# Patient Record
Sex: MALE | Race: BLACK OR AFRICAN AMERICAN
[De-identification: names, ages, dates, MRNs, and addresses within clinical notes are randomized per-mention and may not be internally consistent; named-entity substitution may affect disease eponyms.]

---

## 2015-05-01 VITALS
SYSTOLIC BLOOD PRESSURE: 115 MMHG | DIASTOLIC BLOOD PRESSURE: 72 MMHG | DIASTOLIC BLOOD PRESSURE: 72 MMHG | SYSTOLIC BLOOD PRESSURE: 115 MMHG | DIASTOLIC BLOOD PRESSURE: 72 MMHG | SYSTOLIC BLOOD PRESSURE: 115 MMHG | SYSTOLIC BLOOD PRESSURE: 115 MMHG | DIASTOLIC BLOOD PRESSURE: 72 MMHG

## 2017-08-07 ENCOUNTER — HOSPITAL ENCOUNTER (OUTPATIENT)
Dept: HOSPITAL 61 - KCIC | Age: 77
Discharge: HOME | End: 2017-08-07
Attending: FAMILY MEDICINE
Payer: MEDICARE

## 2017-08-07 DIAGNOSIS — J45.40: Primary | ICD-10-CM

## 2017-08-07 PROCEDURE — 71020: CPT

## 2017-08-07 NOTE — [facility]
Single view chest

 

 

Indication: Asthma

 

 

 

Findings:

 

Heart size is normal. Pulmonary vasculature is within normal limits. No 

consolidation or pleural effusion. No pneumothorax.

 

IMPRESSION:

 

No evidence for heart failure or pneumonia.

 

Electronically signed by: Esteban Lamar MD (8/7/2017 2:29 PM) [place] [id number]

## 2018-12-03 ENCOUNTER — HOSPITAL ENCOUNTER (OUTPATIENT)
Dept: HOSPITAL 61 - NM | Age: 78
Discharge: HOME | End: 2018-12-03
Attending: INTERNAL MEDICINE
Payer: MEDICARE

## 2018-12-03 DIAGNOSIS — R07.89: Primary | ICD-10-CM

## 2018-12-03 DIAGNOSIS — I10: ICD-10-CM

## 2018-12-03 DIAGNOSIS — E11.9: ICD-10-CM

## 2018-12-03 PROCEDURE — 78452 HT MUSCLE IMAGE SPECT MULT: CPT

## 2018-12-03 PROCEDURE — 96374 THER/PROPH/DIAG INJ IV PUSH: CPT

## 2018-12-03 PROCEDURE — 96376 TX/PRO/DX INJ SAME DRUG ADON: CPT

## 2018-12-03 PROCEDURE — 93017 CV STRESS TEST TRACING ONLY: CPT

## 2018-12-03 PROCEDURE — A9500 TC99M SESTAMIBI: HCPCS

## 2018-12-03 PROCEDURE — 96375 TX/PRO/DX INJ NEW DRUG ADDON: CPT

## 2018-12-03 NOTE — RAD
MR#: [id number]

Account#: [id number]

Accession#: [id number]

Date of Study: 12/03/2018

Ordering Physician: SHANTAL SMITH, 

Referring Physician: JUANITA JIMENEZ Tech: RT GET Camacho) (N)





--------------- APPROVED REPORT --------------





Test Type:          Pharmacological

Stress Nurse/Tech: Yelena Rivera R.N.

Test Indications: chest pain

Cardiac History: htn, dm

Medications:     see ehr

Medical History: see ehr

Resting ECG:     sr, some ST depression noted in V3 adn V4

Resting Heart Rate: 77 bpm

Resting Blood Pressure: 140/73mmHg

Pretest Chest Pain: No chest pain



Nurse/Tech Notes

lungs cta, heart tones regular, good radial pulse

Consent: The procedure was explained to the patient in lay terms. Informed consent was witnessed. Ashutosh
eout was entered into [facility] [employer]. History and Stress Test performed by RT Artem (EMANUEL) (N)



Pharm. Details

Pharmacologic stress testing was performed using 0.4mg per 5ml of regadenoson given intravenously ove
r 7-10 seconds.



Stress Symptoms

No chest pain or symptoms.



POST EXERCISE

Reason for Termination: Infusion complete

Target HR: No

Max HR: 97 bpm

Max Blood Pressure: 146/90mmHg

Chest Pain: No. 

Arrhythmia: Yes. occasional unifocal PVC noted

ST Change: No. 



INTERPRETATION

Stress EKG Conclusion: Baseline EKG showed sinus rhythm.  No ischemic changes at peak stress.  No arr
hythmias.



Rest:            Stress:         Viability:   

Radiopharm.Tc99m OejufvgeyZc48o Sestamibi

Dose11.5mCi            34.2mCi            

Duration    13min.           13min.           

Img Date  12/03/2018 12/03/2018      



Rest Admin Site:IV - Right AntecubitalAdministrator:RT GET Mcgill)(N)

Stress Admin Site: IV - Right AntecubitalAdministrator: RT GET Mcgill)(N)



STRESS DATA

End Diast. Vol.102.0mlLVEDV index BSA48.0ml

End Syst. Vol.29.0mlLVESV index BSA14.0ml

Myocardial Cucv008.0gEject. Sqtusgqh97.0%



Stress Scores

Regional WT1.00Summed WT5.00

Regional WM0.00Summed WM0.00



Study quality was good.  Left Ventricular size was Normal at Rest and Stress.

Lung uptake was .  Left Ventricular ejection fraction is 72%.

The rest and stress images show normal perfusion, normal contraction and thickening.



LV Perf. Quant

17 Seg. SSS0.00

17 Seg. SRS2.00

17 Seg. SDS0.00

Stress Defect Extent (% LAD)0.00Rest Defect Extent (% LAD)0.00Rev. Defect Extent (% LAD)0.00

Stress Defect Extent (% LCX) 5.00Rest Defect Extent (% LCX)18.80Rev. Defect Extent (% LCX)0.00

Stress Defect Extent (% RCA)0.00Rest Defect Extent (% RCA)0.00Rev. Defect Extent (% RCA)0.00

Stress Defect Extent (% MARIA M)0.90Rest Defect Extent (% MARIA M)3.30Rev. Defect Extent (% MARIA M)0.00



Conclusion

1. Regadenoson cardioisotope stress test did not show any evidence of ischemia or infarct.

2. Normal left ventricular systolic function with ejection fraction calculated at 72%.

3. Low risk for cardiac events.



Signed by : Yohannes Galvez, 

Electronically Approved : 12/03/2018 13:04:00

## 2020-01-21 ENCOUNTER — HOSPITAL ENCOUNTER (OUTPATIENT)
Dept: HOSPITAL 61 - KCIC | Age: 80
Discharge: HOME | End: 2020-01-21
Attending: FAMILY MEDICINE
Payer: MEDICARE

## 2020-01-21 DIAGNOSIS — M47.812: Primary | ICD-10-CM

## 2020-01-21 DIAGNOSIS — M25.78: ICD-10-CM

## 2020-01-21 DIAGNOSIS — M48.02: ICD-10-CM

## 2020-01-21 DIAGNOSIS — I65.23: ICD-10-CM

## 2020-01-21 PROCEDURE — 72050 X-RAY EXAM NECK SPINE 4/5VWS: CPT

## 2020-01-21 NOTE — [facility]
5 view cervical spine radiographs 1/21/2020

 

CLINICAL HISTORY: Patient fell from ladder 2 days ago with severe right 

neck pain.

 

AP, lateral, bilateral oblique and three AP odontoid digital radiographs 

of the cervical spine were obtained. 

 

There is slight reversal of the normal cervical lordosis. The patient is 

post anterior discectomy and fusion using an anterior plate, bone screws 

and bone graft material at C3-4 and C5-6. Degenerative changes are seen 

involving the remaining cervical disc spaces consisting of disc space 

narrowing, vertebral endplate sclerosis and minimal to mild anterior and 

posterior vertebral body osteophyte formation. Ossification of the 

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 

spaces. Degenerative changes are seen involving the uncovertebral and 

facet joints throughout the cervical disc spaces. No fracture or 

subluxation is seen. Mild right-sided neural foraminal stenosis is seen at

C4-5. Mild to moderate right-sided neural foraminal stenosis is seen at 

C5-6 and moderate to severe left neural foraminal stenosis is seen at 

C4-5. Mild to moderate left neural foraminal stenosis is seen at C5-6. No 

prevertebral soft tissue swelling is seen. Atherosclerotic calcification 

is seen in the region of the carotid bifurcations.

 

IMPRESSION: Postsurgical and degenerative changes are seen involving the 

cervical spine as discussed above. No fracture or subluxation is seen.

 

Electronically signed by: Lam Thacker MD (1/21/2020 11:57 AM) [facility]

## 2020-07-31 ENCOUNTER — HOSPITAL ENCOUNTER (OUTPATIENT)
Dept: HOSPITAL 61 - KCIC CT | Age: 80
End: 2020-07-31
Attending: NURSE PRACTITIONER
Payer: MEDICARE

## 2020-07-31 DIAGNOSIS — K76.0: ICD-10-CM

## 2020-07-31 DIAGNOSIS — R16.0: ICD-10-CM

## 2020-07-31 DIAGNOSIS — N30.00: Primary | ICD-10-CM

## 2020-07-31 PROCEDURE — 74176 CT ABD & PELVIS W/O CONTRAST: CPT

## 2020-07-31 NOTE — [facility]
EXAM: CT Abdomen and Pelvis without IV contrast

 

INDICATION: Reason: ABDOMINAL PAIN, HX KIDNEY STONE / Spl. Instructions:  

/ History: Rt flank and pelvic pain.  Hx stones Rt and Lt side.

 

TECHNIQUE: Multi-detector row CT images were acquired from the lung bases 

through the abdomen and pelvis without the use of IV contrast. Sagittal 

and coronal images were acquired from the transaxial data. All CT scans 

performed at this facility utilize dose optimization techniques as 

appropriate to the exam, including the following: Automated exposure 

control and adjustment of the mA and/or KV according to patient size (this

includes techniques or standardized protocols for targeted exams where 

dose is indication/reason for exam).

 

ORAL CONTRAST: None

 

COMPARISON: None

 

FINDINGS: 

 

The absence of IV contrast limits evaluation of soft tissue pathology.

 

LOWER CHEST: Unremarkable

 

LIVER:  Mild hepatomegaly with diffuse hepatic steatosis noted.

 

BILIARY SYSTEM: Gallbladder is unremarkable. Bile ducts are not dilated.

 

PANCREAS:  Unremarkable

 

SPLEEN:  Unremarkable

 

ADRENALS:  Unremarkable

 

KIDNEYS & URETERS:  Superior pole right renal dense lesion measuring 1.4

cm and 7 mm midpole left renal dense lesion is present. These have imaging

characteristics compatible with hemorrhagic or proteinaceous cysts and 

require no additional imaging follow-up. A simple appearing 5 cm cyst in 

the midpole right kidney and in the inferior pole left kidney also require

no additional imaging follow-up.

 

BLADDER:  Diffuse urinary bladder wall thickening with mild perivesical 

soft tissue stranding is present.

 

REPRODUCTIVE ORGANS:  Prostate measures 4.9 cm in diameter.

 

GASTROINTESTINAL: The stomach, small bowel, and colon are unremarkable. 

The appendix is normal.

 

MESENTERY/PERITONEUM/RETROPERITONEUM: Unremarkable

 

VASCULAR:  Unremarkable

 

LYMPH NODES:  No adenopathy

 

OSSEOUS & SOFT TISSUES:  Unremarkable

 

IMPRESSION:

 

Findings compatible with acute cystitis. No other acute findings in the 

abdomen or pelvis on CT.

 

Electronically signed by: Bridger Kincaid MD (7/31/2020 4:17 PM) 

[id number]

## 2021-06-27 ENCOUNTER — HOSPITAL ENCOUNTER (EMERGENCY)
Dept: HOSPITAL 61 - ER | Age: 81
Discharge: HOME | End: 2021-06-27
Payer: MEDICARE

## 2021-06-27 VITALS — BODY MASS INDEX: 30.07 KG/M2 | HEIGHT: 69 IN | WEIGHT: 203.05 LBS

## 2021-06-27 VITALS — DIASTOLIC BLOOD PRESSURE: 72 MMHG | SYSTOLIC BLOOD PRESSURE: 158 MMHG

## 2021-06-27 DIAGNOSIS — K59.00: Primary | ICD-10-CM

## 2021-06-27 PROCEDURE — 74022 RADEX COMPL AQT ABD SERIES: CPT

## 2021-06-27 PROCEDURE — 99283 EMERGENCY DEPT VISIT LOW MDM: CPT

## 2021-06-27 NOTE — RAD
INDICATION: Reason: CONSTIPATION FOR 3 DAYS, ABDOMINAL DISTENTION / Spl. Instructions:  / History: 



COMPARISON: CT July 31, 2020



IMPRESSION: 



3 views of the chest and abdomen obtained.

Cardiomediastinal silhouette is again mildly prominent.

Degenerative changes of the spine and hips.

Calcific atherosclerosis.

Nonspecific bowel gas pattern with air-filled prominent loop of bowel measuring up to about 4 cm. Dif
ficult to tell if this is secondary to the transverse colon or a dilated loop of small bowel.



Electronically signed by: Tod Cherry MD (6/27/2021 9:25 AM) [id number]

## 2021-06-27 NOTE — PHYS DOC
General Adult


EDM:


Chief Complaint:  CONSTIPATION





HPI:


HPI:





Patient is a 81  year old male who present to ER for evaluation of constipation.

 Patient said he HAD HIS TEETH PULLED last Friday, he has been taking pain 

medication, and It stopped him from having bowel movement for 3 days.  Patient 

denies any nausea vomiting.  Patient feels like his belly is bloated.  Any 

fever, no abdominal pain.  Patient denies any chest pain, no cough, no fever, no

trouble breathing.





Review of Systems:


Review of Systems:


Constitutional:   Denies fever or chills. []


Eyes:   Denies change in visual acuity. []


HENT:   Denies nasal congestion or sore throat. [] 


Respiratory:   Denies cough or shortness of breath. [] 


Cardiovascular:   Denies chest pain or edema. [] 


GI:   Denies abdominal pain, nausea, vomiting, bloody stools or diarrhea.  

Positive constipation.


:  Denies dysuria. [] 


Musculoskeletal:   Denies back pain or joint pain. [] 


Integument:   Denies rash. [] 


Neurologic:   Denies headache, focal weakness or sensory changes. [] 


Endocrine:   Denies polyuria or polydipsia. [] 


Lymphatic:  Denies swollen glands. [] 


Psychiatric:  Denies depression or anxiety. []





Heart Score:


C/O Chest Pain:  N/A


Risk Factors:


Risk Factors:  DM, Current or recent (<one month) smoker, HTN, HLP, family 

history of CAD, obesity.


Risk Scores:


Score 0 - 3:  2.5% MACE over next 6 weeks - Discharge Home


Score 4 - 6:  20.3% MACE over next 6 weeks - Admit for Clinical Observation


Score 7 - 10:  72.7% MACE over next 6 weeks - Early Invasive Strategies





Allergies:


Allergies:





Allergies








Coded Allergies Type Severity Reaction Last Updated Verified


 


  No Known Drug Allergies    14 No











Physical Exam:


PE:





Constitutional: Well developed, well nourished, no acute distress, non-toxic 

appearance. []


HENT: Normocephalic, atraumatic, bilateral external ears normal, oropharynx 

moist, no oral exudates, nose normal. []


Eyes: PERRLA, EOMI, conjunctiva normal, no discharge. [] 


Neck: Normal range of motion, no tenderness, supple, no stridor. [] 


Cardiovascular:Heart rate regular rhythm, no murmur []


Lungs & Thorax:  Bilateral breath sounds clear to auscultation []


Abdomen: Bowel sounds normal, soft, no tenderness, no masses, no pulsatile 

masses. [] 


Skin: Warm, dry, no erythema, no rash. [] 


Back: No tenderness, no CVA tenderness. [] 


Extremities: No tenderness, no cyanosis, no clubbing, ROM intact, no edema. [] 


Neurologic: Alert and oriented X 3, normal motor function, normal sensory 

function, no focal deficits noted. []


Psychologic: Affect normal, judgement normal, mood normal. []





EKG:


EKG:


[]





Radiology/Procedures:


Radiology/Procedures:


[][facility]


                    [address]  [place], [place] [address] [phone]


                                        


                                 IMAGING REPORT





                                     Signed





PATIENT: WALKER,RICO   ACCOUNT: [id number]     MRN#: [id number]


: 1940           LOCATION: ER              AGE: 81


SEX: M                    EXAM DT: 21         ACCESSION#: [id number]


STATUS: REG ER            ORD. PHYSICIAN: CRISTINA GARAY DO


REASON: CONSTIPATION FOR 3 DAYS, ABDOMINAL DISTENTION


PROCEDURE: ACUTE ABDOMEN SERIES








INDICATION: Reason: CONSTIPATION FOR 3 DAYS, ABDOMINAL DISTENTION / Spl. 

Instructions:  / History: 





COMPARISON: CT 2020





IMPRESSION: 





3 views of the chest and abdomen obtained.


Cardiomediastinal silhouette is again mildly prominent.


Degenerative changes of the spine and hips.


Calcific atherosclerosis.


Nonspecific bowel gas pattern with air-filled prominent loop of bowel measuring 

up to about 4 cm. Difficult to tell if this is secondary to the transverse colon

 or a dilated loop of small bowel.





Electronically signed by: Rogelio Cherry MD (2021 9:25 AM) [id number]














DICTATED and SIGNED BY:     ROGELIO CHERRY MD


DATE:     21 [id number]





Course & Med Decision Making:


Course & Med Decision Making


Pertinent Labs and Imaging studies reviewed. (See chart for details)





Patient was given magnesium citrate in the ER, he had a large bowel movement, he

 felt much better.  Patient was discharged home in stable condition





Dragon Disclaimer:


Dragon Disclaimer:


This electronic medical record was generated, in whole or in part, using a voice

 recognition dictation system.





Departure


Departure


Impression:  


   Primary Impression:  


   Constipation


Disposition:  01 HOME / SELF CARE / HOMELESS


Condition:  STABLE


Referrals:  


ANUJ ANGELO MD (PCP)


follow up with your doctor as needed


Patient Instructions:  Constipation, Adult





Additional Instructions:  


Thank you for visiting our Emergency Department. We appreciate you trusting us 

with your care. If any additional problems come up don't hesitate to return to 

visit us. Please follow up with your primary care provider so they can plan 

additional care if needed and know about the problem that you had. If symptoms 

worsen come back to the Emergency Department. Any concerning symptoms that start

 such as chest pain, shortness of air, weakness or numbness on one side of the 

body, running high fevers or any other concerning symptoms return to the ER.











CRISTINA GARAY DO                2021 09:05